# Patient Record
Sex: MALE | Race: BLACK OR AFRICAN AMERICAN | NOT HISPANIC OR LATINO | Employment: FULL TIME
[De-identification: names, ages, dates, MRNs, and addresses within clinical notes are randomized per-mention and may not be internally consistent; named-entity substitution may affect disease eponyms.]

---
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
## 2023-09-08 ENCOUNTER — APPOINTMENT (OUTPATIENT)
Dept: RADIOLOGY | Facility: HOSPITAL | Age: 23
End: 2023-09-08
Attending: EMERGENCY MEDICINE
Payer: COMMERCIAL

## 2023-09-08 ENCOUNTER — HOSPITAL ENCOUNTER (EMERGENCY)
Facility: HOSPITAL | Age: 23
End: 2023-09-08

## 2023-09-08 ENCOUNTER — HOSPITAL ENCOUNTER (EMERGENCY)
Facility: HOSPITAL | Age: 23
Discharge: HOME | End: 2023-09-08
Attending: EMERGENCY MEDICINE | Admitting: EMERGENCY MEDICINE
Payer: COMMERCIAL

## 2023-09-08 VITALS
HEART RATE: 68 BPM | HEIGHT: 72 IN | WEIGHT: 230 LBS | DIASTOLIC BLOOD PRESSURE: 77 MMHG | BODY MASS INDEX: 31.15 KG/M2 | TEMPERATURE: 97.9 F | RESPIRATION RATE: 20 BRPM | SYSTOLIC BLOOD PRESSURE: 128 MMHG | OXYGEN SATURATION: 97 %

## 2023-09-08 DIAGNOSIS — R07.89 ATYPICAL CHEST PAIN: ICD-10-CM

## 2023-09-08 DIAGNOSIS — K20.90 ESOPHAGITIS: ICD-10-CM

## 2023-09-08 LAB
ALBUMIN SERPL BCG-MCNC: 4.4 G/DL (ref 3.5–5.2)
ALP SERPL-CCNC: 71 U/L (ref 40–129)
ALT SERPL W P-5'-P-CCNC: 29 U/L (ref 0–70)
ANION GAP SERPL CALCULATED.3IONS-SCNC: 9 MMOL/L (ref 7–15)
AST SERPL W P-5'-P-CCNC: 39 U/L (ref 0–45)
BASOPHILS # BLD AUTO: 0 10E3/UL (ref 0–0.2)
BASOPHILS NFR BLD AUTO: 1 %
BILIRUB SERPL-MCNC: 0.2 MG/DL
BUN SERPL-MCNC: 12.5 MG/DL (ref 6–20)
CALCIUM SERPL-MCNC: 9.5 MG/DL (ref 8.6–10)
CHLORIDE SERPL-SCNC: 101 MMOL/L (ref 98–107)
CREAT SERPL-MCNC: 0.82 MG/DL (ref 0.67–1.17)
D DIMER PPP FEU-MCNC: <0.27 UG/ML FEU (ref 0–0.5)
DEPRECATED HCO3 PLAS-SCNC: 27 MMOL/L (ref 22–29)
EGFRCR SERPLBLD CKD-EPI 2021: >90 ML/MIN/1.73M2
EOSINOPHIL # BLD AUTO: 0.1 10E3/UL (ref 0–0.7)
EOSINOPHIL NFR BLD AUTO: 2 %
ERYTHROCYTE [DISTWIDTH] IN BLOOD BY AUTOMATED COUNT: 13 % (ref 10–15)
GLUCOSE SERPL-MCNC: 102 MG/DL (ref 70–99)
HCT VFR BLD AUTO: 46.1 % (ref 40–53)
HGB BLD-MCNC: 15.1 G/DL (ref 13.3–17.7)
IMM GRANULOCYTES # BLD: 0 10E3/UL
IMM GRANULOCYTES NFR BLD: 0 %
LIPASE SERPL-CCNC: 21 U/L (ref 13–60)
LYMPHOCYTES # BLD AUTO: 3.1 10E3/UL (ref 0.8–5.3)
LYMPHOCYTES NFR BLD AUTO: 39 %
MCH RBC QN AUTO: 26.8 PG (ref 26.5–33)
MCHC RBC AUTO-ENTMCNC: 32.8 G/DL (ref 31.5–36.5)
MCV RBC AUTO: 82 FL (ref 78–100)
MONOCYTES # BLD AUTO: 0.6 10E3/UL (ref 0–1.3)
MONOCYTES NFR BLD AUTO: 8 %
NEUTROPHILS # BLD AUTO: 4 10E3/UL (ref 1.6–8.3)
NEUTROPHILS NFR BLD AUTO: 50 %
NRBC # BLD AUTO: 0 10E3/UL
NRBC BLD AUTO-RTO: 0 /100
PLATELET # BLD AUTO: 261 10E3/UL (ref 150–450)
POTASSIUM SERPL-SCNC: 3.8 MMOL/L (ref 3.4–5.3)
PROT SERPL-MCNC: 7.5 G/DL (ref 6.4–8.3)
RBC # BLD AUTO: 5.63 10E6/UL (ref 4.4–5.9)
SODIUM SERPL-SCNC: 137 MMOL/L (ref 136–145)
TROPONIN T SERPL HS-MCNC: <6 NG/L
WBC # BLD AUTO: 7.9 10E3/UL (ref 4–11)

## 2023-09-08 PROCEDURE — 99285 EMERGENCY DEPT VISIT HI MDM: CPT | Mod: 25

## 2023-09-08 PROCEDURE — 250N000011 HC RX IP 250 OP 636: Mod: JZ | Performed by: EMERGENCY MEDICINE

## 2023-09-08 PROCEDURE — 250N000013 HC RX MED GY IP 250 OP 250 PS 637: Performed by: EMERGENCY MEDICINE

## 2023-09-08 PROCEDURE — 85025 COMPLETE CBC W/AUTO DIFF WBC: CPT | Performed by: EMERGENCY MEDICINE

## 2023-09-08 PROCEDURE — 36415 COLL VENOUS BLD VENIPUNCTURE: CPT | Performed by: EMERGENCY MEDICINE

## 2023-09-08 PROCEDURE — 80053 COMPREHEN METABOLIC PANEL: CPT | Performed by: EMERGENCY MEDICINE

## 2023-09-08 PROCEDURE — 83690 ASSAY OF LIPASE: CPT | Performed by: EMERGENCY MEDICINE

## 2023-09-08 PROCEDURE — 96374 THER/PROPH/DIAG INJ IV PUSH: CPT

## 2023-09-08 PROCEDURE — 85379 FIBRIN DEGRADATION QUANT: CPT | Performed by: EMERGENCY MEDICINE

## 2023-09-08 PROCEDURE — 84484 ASSAY OF TROPONIN QUANT: CPT | Performed by: EMERGENCY MEDICINE

## 2023-09-08 PROCEDURE — 93005 ELECTROCARDIOGRAM TRACING: CPT | Performed by: EMERGENCY MEDICINE

## 2023-09-08 PROCEDURE — 71046 X-RAY EXAM CHEST 2 VIEWS: CPT

## 2023-09-08 RX ORDER — MAGNESIUM HYDROXIDE/ALUMINUM HYDROXICE/SIMETHICONE 120; 1200; 1200 MG/30ML; MG/30ML; MG/30ML
15 SUSPENSION ORAL ONCE
Status: COMPLETED | OUTPATIENT
Start: 2023-09-08 | End: 2023-09-08

## 2023-09-08 RX ORDER — FAMOTIDINE 20 MG/1
20 TABLET, FILM COATED ORAL 2 TIMES DAILY
Qty: 10 TABLET | Refills: 0 | Status: SHIPPED | OUTPATIENT
Start: 2023-09-08 | End: 2023-09-13

## 2023-09-08 RX ORDER — ONDANSETRON 2 MG/ML
4 INJECTION INTRAMUSCULAR; INTRAVENOUS EVERY 30 MIN PRN
Status: DISCONTINUED | OUTPATIENT
Start: 2023-09-08 | End: 2023-09-08 | Stop reason: HOSPADM

## 2023-09-08 RX ADMIN — ALUMINUM HYDROXIDE, MAGNESIUM HYDROXIDE, AND SIMETHICONE 15 ML: 200; 200; 20 SUSPENSION ORAL at 05:08

## 2023-09-08 RX ADMIN — FAMOTIDINE 20 MG: 10 INJECTION, SOLUTION INTRAVENOUS at 05:08

## 2023-09-08 ASSESSMENT — ENCOUNTER SYMPTOMS
COUGH: 0
LIGHT-HEADEDNESS: 0
CHEST TIGHTNESS: 0
SHORTNESS OF BREATH: 0
NAUSEA: 0
VOMITING: 0
ABDOMINAL PAIN: 0
PALPITATIONS: 0

## 2023-09-08 ASSESSMENT — ACTIVITIES OF DAILY LIVING (ADL): ADLS_ACUITY_SCORE: 35

## 2023-09-08 NOTE — ED PROVIDER NOTES
NAME: Rama Velasco  AGE: 23 year old male  YOB: 2000  MRN: [id number]  EVALUATION DATE & TIME: 2023  4:45 AM    PCP: Alma Yi    ED PROVIDER: Bradford Joseph M.D.    Chief Complaint   Patient presents with    Chest Pain     FINAL IMPRESSION:  1. Atypical chest pain    2. Esophagitis      MEDICAL DECISION MAKIN:54 AM I met with the patient, obtained history, performed an initial exam, and discussed options and plan for diagnostics and treatment here in the ED.   Patient was clinically assessed and consented to treatment. After assessment, medical decision making and workup were discussed with the patient. The patient was agreeable to plan for testing, workup, and treatment.  Pertinent Labs & Imaging studies reviewed. (See chart for details)     Medical Decision Making    History:  Supplemental history from: Documented in chart, if applicable  External Record(s) reviewed: Documented in chart, if applicable.    Work Up:  Chart documentation includes differential considered and any EKGs or imaging independently interpreted by provider, where specified.  In additional to work up documented, I considered the following work up: Documented in chart, if applicable.    External consultation:  Discussion of management with another provider: Documented in chart, if applicable    Complicating factors:  Care impacted by chronic illness: N/A  Care affected by social determinants of health: N/A    Disposition considerations: Discharge. I prescribed additional prescription strength medication(s) as charted. See documentation for any additional details.    Rama Velasco is a 23 year old male who presents with chest pain.   Differential diagnosis includes but not limited to acute coronary syndrome, pulm embolism, GERD, esophagitis, pneumothorax, pleuritis.  Otherwise healthy 23-year-old male with sudden onset chest pain 2 hours prior to arrival.  Patient's pain is sharp with some burning feeling.   Possibly gastrointestinal or esophagitis related but patient does not report any history of GERD symptoms.  He does not any shortness of breath, no nausea or vomiting, no regurg feelings, no lightheadedness.  EKG is unremarkable for any acute process and I have low suspicion for pulmonary embolism.  I did speak with patient and discussed possible check of labs which he would be agreeable to.  Patient will be sent for chest x-ray as well and presently patient appears comfortable with decreasing symptoms.  Chest x-ray returned clear and CBC unremarkable.  Metabolic panel, hepatic panel, troponin and D-dimer all negative.  Patient unlikely with cardiopulmonary disease and most like this is esophagitis.  Chest x-ray again was clear and after discussion with patient Pepcid and GI cocktail complete relief of symptoms.  Patient I discussed findings and I do not feel repeat troponin is necessary given the low risk factors.  Patient will be plan for discharge home and follow-up with his primary clinic.  0 minutes of critical care time    MEDICATIONS GIVEN IN THE EMERGENCY:  Medications   ondansetron (ZOFRAN) injection 4 mg (has no administration in time range)   alum & mag hydroxide-simethicone (MAALOX) suspension 15 mL (15 mLs Oral $Given 9/8/23 0508)   famotidine (PEPCID) injection 20 mg (20 mg Intravenous $Given 9/8/23 0508)       NEW PRESCRIPTIONS STARTED AT TODAY'S ER VISIT:  Discharge Medication List as of 9/8/2023  6:12 AM        START taking these medications    Details   famotidine (PEPCID) 20 MG tablet Take 1 tablet (20 mg) by mouth 2 times daily for 5 days, Disp-10 tablet, R-0, Local Print                =================================================================    HPI    Patient information was obtained from: Patient  Use of : N/A , Yes ([employer]  Phone In Person) - Language         Rama ANDRIA Velasco is a 23 year old male with a past medical history of no past medical problems, who presents chest pain  that is sharp with burning sensation.  Patient reports its on his left chest chest to the left of the sternum and under the sternum.  Patient reports no nausea, no vomiting, no lightheadedness, no shortness of breath.  He initially report he was laying down relaxing when it came on.  I asked if he was asleep and it woke him he said no he was ready awake.  On further discussion patient then admitted that he and his wife are having sexual intercourse when this happened but he was laying down flat.  He reports no pounding in his chest or neck or radiation of pain.  Patient feels pain and symptoms are going down on their own somewhat here but after symptoms started he had checked the Internet and became scared or anxious so came to the ER.  He does report his anxiety is feeling better after coming into the ER and reassurance on the EKG.    REVIEW OF SYSTEMS   Review of Systems   Respiratory:  Negative for cough, chest tightness and shortness of breath.    Cardiovascular:  Positive for chest pain. Negative for palpitations and leg swelling.   Gastrointestinal:  Negative for abdominal pain, nausea and vomiting.   Neurological:  Negative for light-headedness.   All other systems reviewed and are negative.       PAST MEDICAL HISTORY:  No past medical history on file.    PAST SURGICAL HISTORY:  No past surgical history on file.    CURRENT MEDICATIONS:      Current Facility-Administered Medications:     ondansetron (ZOFRAN) injection 4 mg, 4 mg, Intravenous, Q30 Min PRN, Bradford Joseph MD    Current Outpatient Medications:     famotidine (PEPCID) 20 MG tablet, Take 1 tablet (20 mg) by mouth 2 times daily for 5 days, Disp: 10 tablet, Rfl: 0    selenium sulfide (SELSUN) 2.5 % shampoo, Apply topically daily as needed, Disp: , Rfl:     ALLERGIES:  No Known Allergies    FAMILY HISTORY:  Family History   Problem Relation Age of Onset    Diabetes Mother     Coronary Artery Disease No family hx of     Hypertension No  family hx of     Hyperlipidemia No family hx of     Breast Cancer No family hx of     Cancer - colorectal No family hx of     Ovarian Cancer No family hx of     Prostate Cancer No family hx of     Other Cancer No family hx of     Mental Illness No family hx of     Cerebrovascular Disease No family hx of     Anesthesia Reaction No family hx of     Asthma No family hx of     Osteoporosis No family hx of     Known Genetic Syndrome No family hx of     Obesity No family hx of     Unknown/Adopted No family hx of        SOCIAL HISTORY:   Social History     Socioeconomic History    Marital status: Single   Tobacco Use    Smoking status: Never       PHYSICAL EXAM:    Vitals: /77   Pulse 68   Temp 97.9  F (36.6  C) (Temporal)   Resp 20   Ht 1.829 m (6')   Wt 104.3 kg (230 lb)   SpO2 97%   BMI 31.19 kg/m     Physical Exam  Vitals and nursing note reviewed.   Constitutional:       General: He is not in acute distress.     Appearance: He is well-developed and normal weight. He is not ill-appearing, toxic-appearing or diaphoretic.   HENT:      Head: Normocephalic.   Neck:      Vascular: No JVD.   Cardiovascular:      Rate and Rhythm: Normal rate and regular rhythm.      Heart sounds: Normal heart sounds.   Pulmonary:      Effort: Pulmonary effort is normal. No tachypnea, accessory muscle usage or respiratory distress.      Breath sounds: Normal breath sounds. No stridor.   Chest:      Chest wall: No tenderness.   Abdominal:      Palpations: Abdomen is soft. There is no mass.      Tenderness: There is no abdominal tenderness. There is no guarding or rebound.   Musculoskeletal:         General: Normal range of motion.      Cervical back: Normal range of motion and neck supple.      Right lower leg: No tenderness. No edema.      Left lower leg: No tenderness. No edema.   Skin:     General: Skin is warm and dry.      Coloration: Skin is not cyanotic or pale.   Neurological:      General: No focal deficit present.       Mental Status: He is alert.   Psychiatric:         Behavior: Behavior normal.          LAB:  All pertinent labs reviewed and interpreted.  Labs Ordered and Resulted from Time of ED Arrival to Time of ED Departure   COMPREHENSIVE METABOLIC PANEL - Abnormal       Result Value    Sodium 137      Potassium 3.8      Chloride 101      Carbon Dioxide (CO2) 27      Anion Gap 9      Urea Nitrogen 12.5      Creatinine 0.82      Calcium 9.5      Glucose 102 (*)     Alkaline Phosphatase 71      AST 39      ALT 29      Protein Total 7.5      Albumin 4.4      Bilirubin Total 0.2      GFR Estimate >90     D DIMER QUANTITATIVE - Normal    D-Dimer Quantitative <0.27     LIPASE - Normal    Lipase 21     TROPONIN T, HIGH SENSITIVITY - Normal    Troponin T, High Sensitivity <6     CBC WITH PLATELETS AND DIFFERENTIAL    WBC Count 7.9      RBC Count 5.63      Hemoglobin 15.1      Hematocrit 46.1      MCV 82      MCH 26.8      MCHC 32.8      RDW 13.0      Platelet Count 261      % Neutrophils 50      % Lymphocytes 39      % Monocytes 8      % Eosinophils 2      % Basophils 1      % Immature Granulocytes 0      NRBCs per 100 WBC 0      Absolute Neutrophils 4.0      Absolute Lymphocytes 3.1      Absolute Monocytes 0.6      Absolute Eosinophils 0.1      Absolute Basophils 0.0      Absolute Immature Granulocytes 0.0      Absolute NRBCs 0.0         RADIOLOGY:  XR Chest 2 Views   Final Result   IMPRESSION: No evidence of active cardiopulmonary disease.                          EKG:   Performed at: 4:55 AM on September 8, 2023.  Impression: Sinus rhythm with first-degree AV block, no signs of acute ST elevation ischemia, no inverted T waves, no other signs of ischemia or fatal arrhythmia  Rate: 69 bpm  Rhythm: Sinus rhythm with first-degree AV block  QRS Interval: 90 ms  QTc Interval: 407 ms  Comparison: No prior EKG for comparison.  I have independently reviewed and interpreted the EKG(s) documented above.     PROCEDURES:   Procedures      Bradford Joseph M.D.  Emergency Medicine  [facility] Emergency Department       Bradford Joseph MD  09/08/23 9296

## 2023-09-08 NOTE — ED TRIAGE NOTES
Pt is for left chest pain, started 30 minutes ago while pt was sitting. Pt denies injuries/trauma to site. Pt states he felt nauseated when it happened, but not now and no vomiting. Denies shortness of breath now.      Triage Assessment       Row Name 09/08/23 0441       Triage Assessment (Adult)    Airway WDL WDL       Respiratory WDL    Respiratory WDL WDL       Cardiac WDL    Cardiac WDL chest pain

## 2023-09-08 NOTE — ED NOTES
The patient is resting calmly in his bed. He is A&Ox4 and able to answer all questions appropriately. Skin is pwd. He reports that the pain is currently 6/10 but that it was worse when it started. He denies drug or alcohol use. He denies recent trauma. He reports that he did have some anxiety but that it started after the chest pain. He was sitting down when the pain started.

## 2023-09-10 LAB
ATRIAL RATE - MUSE: 69 BPM
DIASTOLIC BLOOD PRESSURE - MUSE: NORMAL MMHG
INTERPRETATION ECG - MUSE: NORMAL
P AXIS - MUSE: 59 DEGREES
PR INTERVAL - MUSE: 220 MS
QRS DURATION - MUSE: 90 MS
QT - MUSE: 380 MS
QTC - MUSE: 407 MS
R AXIS - MUSE: 8 DEGREES
SYSTOLIC BLOOD PRESSURE - MUSE: NORMAL MMHG
T AXIS - MUSE: 20 DEGREES
VENTRICULAR RATE- MUSE: 69 BPM

## 2024-10-10 ENCOUNTER — APPOINTMENT (OUTPATIENT)
Dept: GENERAL RADIOLOGY | Facility: CLINIC | Age: 24
End: 2024-10-10
Attending: EMERGENCY MEDICINE

## 2024-10-10 ENCOUNTER — HOSPITAL ENCOUNTER (EMERGENCY)
Facility: CLINIC | Age: 24
Discharge: HOME | End: 2024-10-10
Attending: EMERGENCY MEDICINE | Admitting: EMERGENCY MEDICINE

## 2024-10-10 VITALS
BODY MASS INDEX: 36.4 KG/M2 | HEIGHT: 71 IN | DIASTOLIC BLOOD PRESSURE: 74 MMHG | OXYGEN SATURATION: 93 % | RESPIRATION RATE: 16 BRPM | HEART RATE: 76 BPM | SYSTOLIC BLOOD PRESSURE: 127 MMHG | WEIGHT: 260 LBS | TEMPERATURE: 98.6 F

## 2024-10-10 DIAGNOSIS — M54.42 BILATERAL LOW BACK PAIN WITH BILATERAL SCIATICA, UNSPECIFIED CHRONICITY: ICD-10-CM

## 2024-10-10 DIAGNOSIS — M54.41 BILATERAL LOW BACK PAIN WITH BILATERAL SCIATICA, UNSPECIFIED CHRONICITY: ICD-10-CM

## 2024-10-10 PROCEDURE — 72100 X-RAY EXAM L-S SPINE 2/3 VWS: CPT | Mod: 26 | Performed by: RADIOLOGY

## 2024-10-10 PROCEDURE — 99284 EMERGENCY DEPT VISIT MOD MDM: CPT | Performed by: EMERGENCY MEDICINE

## 2024-10-10 PROCEDURE — 250N000013 HC RX MED GY IP 250 OP 250 PS 637: Performed by: EMERGENCY MEDICINE

## 2024-10-10 PROCEDURE — 72100 X-RAY EXAM L-S SPINE 2/3 VWS: CPT

## 2024-10-10 RX ORDER — CYCLOBENZAPRINE HCL 10 MG
10 TABLET ORAL 3 TIMES DAILY PRN
Qty: 20 TABLET | Refills: 0 | Status: SHIPPED | OUTPATIENT
Start: 2024-10-10 | End: 2024-10-16

## 2024-10-10 RX ORDER — METHYLPREDNISOLONE 4 MG/1
TABLET ORAL
Qty: 21 TABLET | Refills: 0 | Status: SHIPPED | OUTPATIENT
Start: 2024-10-10

## 2024-10-10 RX ORDER — HYDROCODONE BITARTRATE AND ACETAMINOPHEN 5; 325 MG/1; MG/1
1 TABLET ORAL ONCE
Status: COMPLETED | OUTPATIENT
Start: 2024-10-10 | End: 2024-10-10

## 2024-10-10 RX ADMIN — HYDROCODONE BITARTRATE AND ACETAMINOPHEN 1 TABLET: 5; 325 TABLET ORAL at 20:56

## 2024-10-10 ASSESSMENT — ACTIVITIES OF DAILY LIVING (ADL)
ADLS_ACUITY_SCORE: 35

## 2024-10-10 ASSESSMENT — COLUMBIA-SUICIDE SEVERITY RATING SCALE - C-SSRS
2. HAVE YOU ACTUALLY HAD ANY THOUGHTS OF KILLING YOURSELF IN THE PAST MONTH?: NO
1. IN THE PAST MONTH, HAVE YOU WISHED YOU WERE DEAD OR WISHED YOU COULD GO TO SLEEP AND NOT WAKE UP?: NO
6. HAVE YOU EVER DONE ANYTHING, STARTED TO DO ANYTHING, OR PREPARED TO DO ANYTHING TO END YOUR LIFE?: NO

## 2024-10-11 NOTE — ED PROVIDER NOTES
[place] EMERGENCY DEPARTMENT ([facility])    10/10/24       ED PROVIDER NOTE    History     Chief Complaint   Patient presents with    Back Pain     HPI  Rama Velasco is an otherwise healthy 24 year old male who presents to the ED for evaluation of low back pain. Patient reports a one month history of low back pain that began while playing basketball. When it initially began his low back started getting tight and uncomfortable and he was having difficulty bending over so he stopped playing and went home. Then throughout the past month he would have small amounts of similar pain. Then 5 days ago he was playing basketball again and the pain got worse. He reports the pain is on both sides but is intermittently worse on alternating sides. He describes it as throbbing pain. Worse when sitting. The pain also intermittent radiates down the back of his thighs with occasional numbness/tingling. Denies any falls preceding the pain. Denies history of back issues. Denies changes to bowel or bladder. Denies history of back surgery. Denies any medication use. No other symptoms noted.      Past Medical History  No past medical history on file.  No past surgical history on file.  selenium sulfide (SELSUN) 2.5 % shampoo      No Known Allergies  Family History  Family History   Problem Relation Age of Onset    Diabetes Mother     Coronary Artery Disease No family hx of     Hypertension No family hx of     Hyperlipidemia No family hx of     Breast Cancer No family hx of     Cancer - colorectal No family hx of     Ovarian Cancer No family hx of     Prostate Cancer No family hx of     Other Cancer No family hx of     Mental Illness No family hx of     Cerebrovascular Disease No family hx of     Anesthesia Reaction No family hx of     Asthma No family hx of     Osteoporosis No family hx of     Known Genetic Syndrome No family hx of     Obesity No family hx of     Unknown/Adopted No family hx of      Social History   Social  "History     Tobacco Use    Smoking status: Never      Past medical history, past surgical history, medications, allergies, family history, and social history were reviewed with the patient. No additional pertinent items.     A complete review of systems was performed with pertinent positives and negatives noted in the HPI, and all other systems negative.    Physical Exam   BP: 127/74  Pulse: 76  Temp: 98.6  F (37  C)  Resp: 16  Height: 180.3 cm (5' 11\")  Weight: 117.9 kg (260 lb)  SpO2: 93 %  Physical Exam  Vitals and nursing note reviewed.   Constitutional:       General: He is not in acute distress.     Appearance: Normal appearance. He is not toxic-appearing.   HENT:      Head: Atraumatic.   Eyes:      General: No scleral icterus.     Conjunctiva/sclera: Conjunctivae normal.   Cardiovascular:      Rate and Rhythm: Normal rate.      Heart sounds: Normal heart sounds.   Pulmonary:      Effort: Pulmonary effort is normal. No respiratory distress.      Breath sounds: Normal breath sounds.   Abdominal:      Palpations: Abdomen is soft.      Tenderness: There is no abdominal tenderness.   Musculoskeletal:         General: No deformity.      Cervical back: Neck supple.   Skin:     General: Skin is warm.   Neurological:      General: No focal deficit present.      Mental Status: He is alert and oriented to person, place, and time.      Sensory: No sensory deficit.      Motor: No weakness.      Gait: Gait normal.      Comments: Full strength and sensation lower extremities bilaterally.  No saddle anesthesia.           ED Course, Procedures, & Data      Procedures                No results found for any visits on 10/10/24.  Medications - No data to display  Labs Ordered and Resulted from Time of ED Arrival to Time of ED Departure - No data to display  No orders to display              Assessment & Plan    Is a 24-year-old male who presents with low back pain.  He began experiencing low back pain after playing basketball " ago and was exacerbated several days ago with some activity.  He notes low back pain with bilateral sciatica.  No weakness or loss of bowel or bladder control.  Exam demonstrates no acute abnormalities.  X-ray shows no acute abnormalities.  Patient given hydrocodone/acetaminophen which decreases pain.  Will start patient on a course of cyclobenzaprine as well as Medrol Dosepak.  I discussed care of back injury with patient.  We will discharge with return precautions.    I have reviewed the nursing notes. I have reviewed the findings, diagnosis, plan and need for follow up with the patient.    New Prescriptions    No medications on file       Final diagnoses:   None   I, Tin Pope, am serving as a trained medical scribe to document services personally performed by Eduardo Cueto DO based on the provider's statements to me on October 10, 2024.  This document has been checked and approved by the attending provider.    I, Eduardo Cueto DO, was physically present and have reviewed and verified the accuracy of this note documented by Tin Pope medical scribe.      Eduardo Cueto DO  [facility] EMERGENCY DEPARTMENT  10/10/2024     Eduardo Cueto DO  10/11/24 0055

## 2024-10-11 NOTE — ED TRIAGE NOTES
Patient presents with lower back pain left side worse than right fro the past 3-4 days. Denies any injuries. Describes It as a stabbing spasm pain that radiates down his left leg. Denies any loss of bowel or bladder function, endorses numbness down his left leg.      Triage Assessment (Adult)       Row Name 10/10/24 1934          Triage Assessment    Airway WDL WDL        Respiratory WDL    Respiratory WDL WDL        Skin Circulation/Temperature WDL    Skin Circulation/Temperature WDL WDL        Cardiac WDL    Cardiac WDL WDL        Peripheral/Neurovascular WDL    Peripheral Neurovascular WDL WDL        Cognitive/Neuro/Behavioral WDL    Cognitive/Neuro/Behavioral WDL WDL        [place] Coma Scale    Best Eye Response 4-->(E4) spontaneous     Best Motor Response 6-->(M6) obeys commands     Best Verbal Response 5-->(V5) oriented     [place] Coma Scale Score 15

## 2024-11-16 ENCOUNTER — HEALTH MAINTENANCE LETTER (OUTPATIENT)
Age: 24
End: 2024-11-16